# Patient Record
Sex: MALE | Race: WHITE | Employment: UNEMPLOYED | URBAN - METROPOLITAN AREA
[De-identification: names, ages, dates, MRNs, and addresses within clinical notes are randomized per-mention and may not be internally consistent; named-entity substitution may affect disease eponyms.]

---

## 2017-08-15 ENCOUNTER — OFFICE VISIT (OUTPATIENT)
Dept: FAMILY MEDICINE CLINIC | Facility: CLINIC | Age: 22
End: 2017-08-15

## 2017-08-15 ENCOUNTER — APPOINTMENT (OUTPATIENT)
Dept: LAB | Age: 22
End: 2017-08-15
Attending: FAMILY MEDICINE
Payer: COMMERCIAL

## 2017-08-15 VITALS
BODY MASS INDEX: 32.47 KG/M2 | SYSTOLIC BLOOD PRESSURE: 110 MMHG | WEIGHT: 245 LBS | HEART RATE: 57 BPM | DIASTOLIC BLOOD PRESSURE: 70 MMHG | HEIGHT: 73 IN

## 2017-08-15 DIAGNOSIS — F33.1 MODERATE EPISODE OF RECURRENT MAJOR DEPRESSIVE DISORDER (HCC): ICD-10-CM

## 2017-08-15 DIAGNOSIS — Z00.00 ANNUAL PHYSICAL EXAM: Primary | ICD-10-CM

## 2017-08-15 DIAGNOSIS — Z00.00 ANNUAL PHYSICAL EXAM: ICD-10-CM

## 2017-08-15 DIAGNOSIS — G93.0 BRAIN CYST: ICD-10-CM

## 2017-08-15 DIAGNOSIS — Z72.0 TOBACCO ABUSE: ICD-10-CM

## 2017-08-15 DIAGNOSIS — F07.81 POST CONCUSSION SYNDROME: ICD-10-CM

## 2017-08-15 LAB
CHOLEST SERPL-MCNC: 185 MG/DL (ref 110–200)
GLUCOSE SERPL-MCNC: 98 MG/DL (ref 70–99)
HDLC SERPL-MCNC: 30 MG/DL
LDLC SERPL CALC-MCNC: 119 MG/DL (ref 0–99)
NONHDLC SERPL-MCNC: 155 MG/DL
TRIGL SERPL-MCNC: 178 MG/DL (ref 1–149)
TSH SERPL-ACNC: 4.84 UIU/ML (ref 0.45–5.33)

## 2017-08-15 PROCEDURE — 90471 IMMUNIZATION ADMIN: CPT | Performed by: FAMILY MEDICINE

## 2017-08-15 PROCEDURE — 84443 ASSAY THYROID STIM HORMONE: CPT

## 2017-08-15 PROCEDURE — 99395 PREV VISIT EST AGE 18-39: CPT | Performed by: FAMILY MEDICINE

## 2017-08-15 PROCEDURE — 90651 9VHPV VACCINE 2/3 DOSE IM: CPT | Performed by: FAMILY MEDICINE

## 2017-08-15 PROCEDURE — 87389 HIV-1 AG W/HIV-1&-2 AB AG IA: CPT

## 2017-08-15 PROCEDURE — 80061 LIPID PANEL: CPT

## 2017-08-15 PROCEDURE — 82947 ASSAY GLUCOSE BLOOD QUANT: CPT

## 2017-08-15 PROCEDURE — 36415 COLL VENOUS BLD VENIPUNCTURE: CPT

## 2017-08-15 NOTE — PROGRESS NOTES
physical    Patient's past medical surgical family social history was reviewed. Review of Systems  Allergic: no environmental allergies or food allergies  Cardiovascular: no chest pain, irregular heartbeat, or palpitations.   Constitutional: no fever or Moderate episode of recurrent major depressive disorder (HCC)  discussed  Maybe multiple concussions caused? Refer psych  - ASSAY, THYROID STIM HORMONE; Future  - OP REFERRAL TO PSYCHIATRY    3. Tobacco abuse  discussed    4.  Post concussion syndrome  Sta

## 2017-08-15 NOTE — PATIENT INSTRUCTIONS
Getting Support for Quitting Smoking  You don’t have to go through the process of quitting smoking without support. Tell people you are quitting. The support of friends, coworkers, and family members can make a big difference.  Face-to-face or telephone Sometimes you may just need to talk when you miss smoking. Ex-smokers are good to talk to, because they’re likely to know how you feel. You may need extra support in the first few weeks after you quit.  Ask a friend to call you each day to see how you’re do

## 2017-08-16 LAB — HIV1+2 AB SERPL QL IA: NONREACTIVE

## 2017-08-24 NOTE — TELEPHONE ENCOUNTER
MRI Brain denied by [facility]. A peer to peer can be initiated at     [phone]  Case [id number]  [facility] 02/26/1995    Thank you!  Dawn Bustillos [phone] [facility]

## 2018-10-16 ENCOUNTER — OFFICE VISIT (OUTPATIENT)
Dept: FAMILY MEDICINE CLINIC | Facility: CLINIC | Age: 23
End: 2018-10-16
Payer: COMMERCIAL

## 2018-10-16 ENCOUNTER — APPOINTMENT (OUTPATIENT)
Dept: LAB | Age: 23
End: 2018-10-16
Attending: FAMILY MEDICINE
Payer: COMMERCIAL

## 2018-10-16 VITALS
SYSTOLIC BLOOD PRESSURE: 123 MMHG | HEIGHT: 73 IN | HEART RATE: 54 BPM | WEIGHT: 220 LBS | TEMPERATURE: 98 F | BODY MASS INDEX: 29.16 KG/M2 | RESPIRATION RATE: 17 BRPM | DIASTOLIC BLOOD PRESSURE: 72 MMHG

## 2018-10-16 DIAGNOSIS — Z00.00 ANNUAL PHYSICAL EXAM: ICD-10-CM

## 2018-10-16 DIAGNOSIS — Z00.00 ANNUAL PHYSICAL EXAM: Primary | ICD-10-CM

## 2018-10-16 PROCEDURE — 90472 IMMUNIZATION ADMIN EACH ADD: CPT | Performed by: FAMILY MEDICINE

## 2018-10-16 PROCEDURE — 36415 COLL VENOUS BLD VENIPUNCTURE: CPT

## 2018-10-16 PROCEDURE — 87389 HIV-1 AG W/HIV-1&-2 AB AG IA: CPT

## 2018-10-16 PROCEDURE — 99395 PREV VISIT EST AGE 18-39: CPT | Performed by: FAMILY MEDICINE

## 2018-10-16 PROCEDURE — 80061 LIPID PANEL: CPT

## 2018-10-16 PROCEDURE — 90686 IIV4 VACC NO PRSV 0.5 ML IM: CPT | Performed by: FAMILY MEDICINE

## 2018-10-16 PROCEDURE — 90471 IMMUNIZATION ADMIN: CPT | Performed by: FAMILY MEDICINE

## 2018-10-16 PROCEDURE — 82947 ASSAY GLUCOSE BLOOD QUANT: CPT

## 2018-10-16 PROCEDURE — 90651 9VHPV VACCINE 2/3 DOSE IM: CPT | Performed by: FAMILY MEDICINE

## 2018-10-16 NOTE — PROGRESS NOTES
REASON FOR VISIT:    Katalina Arriaga is a 20year old male who presents for an [address].       Patient Active Problem List:     Post concussion syndrome- football    General Health           CAGE:           Depression Screening (PHQ-2/PHQ-9 diagnoses    ALLERGIES:   No Known Allergies  CURRENT MEDICATIONS:     No current outpatient medications on file. MEDICAL INFORMATION:   No past medical history on file. No past surgical history on file.    Family History   Problem Relation Age of Onset testes, no masses, no hernia, no penile lesions  RECTAL: deferred  MUSCULOSKELETAL: back is not tender, FROM of the back  EXTREMITIES: no cyanosis, clubbing or edema  NEURO: Oriented times three, cranial nerves are intact, motor and sensory are grossly int

## 2018-10-24 ENCOUNTER — TELEPHONE (OUTPATIENT)
Dept: FAMILY MEDICINE CLINIC | Facility: CLINIC | Age: 23
End: 2018-10-24

## 2018-10-24 NOTE — TELEPHONE ENCOUNTER
----- Message from Juliet Vega DO sent at 10/19/2018  8:16 AM CDT -----  Cholesterol blood sugar HIV testing was all normal